# Patient Record
Sex: FEMALE | Race: ASIAN | NOT HISPANIC OR LATINO | URBAN - NONMETROPOLITAN AREA
[De-identification: names, ages, dates, MRNs, and addresses within clinical notes are randomized per-mention and may not be internally consistent; named-entity substitution may affect disease eponyms.]

---

## 2021-08-17 ENCOUNTER — IMPORTED ENCOUNTER (OUTPATIENT)
Dept: URBAN - NONMETROPOLITAN AREA CLINIC 1 | Facility: CLINIC | Age: 76
End: 2021-08-17

## 2021-08-17 PROBLEM — S05.01XA: Noted: 2021-08-17

## 2021-08-17 PROCEDURE — 99203 OFFICE O/P NEW LOW 30 MIN: CPT

## 2021-08-17 PROCEDURE — 92071 CONTACT LENS FITTING FOR TX: CPT

## 2021-08-17 NOTE — PATIENT DISCUSSION
Corneal Abrasion OD-  Discussed Findings w/patient -  No foreign body seen on today's slit lamp -  BCL placed OD-  Start Polytrim QID OD-  RTC 3 Day f/u

## 2021-08-20 ENCOUNTER — IMPORTED ENCOUNTER (OUTPATIENT)
Dept: URBAN - NONMETROPOLITAN AREA CLINIC 1 | Facility: CLINIC | Age: 76
End: 2021-08-20

## 2021-08-20 PROBLEM — S05.01XD: Noted: 2021-08-20

## 2021-08-20 PROCEDURE — 99213 OFFICE O/P EST LOW 20 MIN: CPT

## 2021-08-20 NOTE — PATIENT DISCUSSION
Corneal Abrasion OD-  Discussed Findings w/patient -  removed and replaced BCL-  patient is concerned that she has an allergy to contact lenses-  because she is on vacation I will not attempt other treatments such a Prokera but I did discuss this with her as a possibility-  continue Polytrim QID-  start Refresh for CL's or Refresh PF drops PRN OD-  patient is going to [address] [place] [address] and will not be back here-  continue to monitor in [address] [place] [address] or at her home

## 2022-04-09 ASSESSMENT — VISUAL ACUITY
OD_CC: 20/400
OD_PH: 20/200
OS_CC: 20/25

## 2022-04-09 ASSESSMENT — TONOMETRY
OS_IOP_MMHG: 13
OS_IOP_MMHG: 15